# Patient Record
Sex: FEMALE | Race: WHITE | NOT HISPANIC OR LATINO
[De-identification: names, ages, dates, MRNs, and addresses within clinical notes are randomized per-mention and may not be internally consistent; named-entity substitution may affect disease eponyms.]

---

## 2021-05-06 ENCOUNTER — APPOINTMENT (OUTPATIENT)
Age: 26
End: 2021-05-06
Payer: MEDICAID

## 2021-05-06 PROCEDURE — 0002A: CPT

## 2021-06-14 PROBLEM — Z00.00 ENCOUNTER FOR PREVENTIVE HEALTH EXAMINATION: Status: ACTIVE | Noted: 2021-06-14

## 2021-06-24 ENCOUNTER — APPOINTMENT (OUTPATIENT)
Dept: GASTROENTEROLOGY | Facility: CLINIC | Age: 26
End: 2021-06-24

## 2021-12-01 ENCOUNTER — TRANSCRIPTION ENCOUNTER (OUTPATIENT)
Age: 26
End: 2021-12-01

## 2021-12-01 ENCOUNTER — APPOINTMENT (OUTPATIENT)
Dept: ULTRASOUND IMAGING | Facility: CLINIC | Age: 26
End: 2021-12-01
Payer: MEDICAID

## 2021-12-01 ENCOUNTER — RESULT REVIEW (OUTPATIENT)
Age: 26
End: 2021-12-01

## 2021-12-01 ENCOUNTER — OUTPATIENT (OUTPATIENT)
Dept: OUTPATIENT SERVICES | Facility: HOSPITAL | Age: 26
LOS: 1 days | End: 2021-12-01
Payer: MEDICAID

## 2021-12-01 DIAGNOSIS — Z00.00 ENCOUNTER FOR GENERAL ADULT MEDICAL EXAMINATION WITHOUT ABNORMAL FINDINGS: ICD-10-CM

## 2021-12-01 PROCEDURE — 19083 BX BREAST 1ST LESION US IMAG: CPT

## 2021-12-01 PROCEDURE — A4648: CPT

## 2021-12-01 PROCEDURE — 88305 TISSUE EXAM BY PATHOLOGIST: CPT | Mod: 26

## 2021-12-01 PROCEDURE — 19083 BX BREAST 1ST LESION US IMAG: CPT | Mod: RT

## 2021-12-01 PROCEDURE — 88305 TISSUE EXAM BY PATHOLOGIST: CPT

## 2023-05-23 ENCOUNTER — APPOINTMENT (OUTPATIENT)
Dept: ORTHOPEDIC SURGERY | Facility: CLINIC | Age: 28
End: 2023-05-23
Payer: COMMERCIAL

## 2023-05-23 VITALS — WEIGHT: 165 LBS | BODY MASS INDEX: 26.52 KG/M2 | HEIGHT: 66 IN

## 2023-05-23 DIAGNOSIS — Z78.9 OTHER SPECIFIED HEALTH STATUS: ICD-10-CM

## 2023-05-23 PROCEDURE — 99204 OFFICE O/P NEW MOD 45 MIN: CPT

## 2023-05-23 PROCEDURE — 72070 X-RAY EXAM THORAC SPINE 2VWS: CPT

## 2023-05-23 NOTE — ASSESSMENT
[FreeTextEntry1] : 28 years old Femal with axial back pain.  had what sounds like radiculopathy that has improved.\par taking nsaids intermittently\par c/w nsaids\par PT\par FU 6 weeks\par if pain persists MRI T spine

## 2023-05-23 NOTE — HISTORY OF PRESENT ILLNESS
[de-identified] : The patient is a 28 year old female who presents today complaining of T-Spine.\par Date of Injury/Onset: ~ 3 month\par Pain:    At Rest: 0/10  \par With Activity:  4/10  \par Mechanism of injury: pt reports taking a acro yoga class, and was on top of partner and landed on her back. She reports hearing a pop.\par Quality of symptoms: Sharp, pressure around the spine\par Improves with: Ibuprofen\par Worse with: Sleeping, exercises\par Prior treatment/ Imaging: Went to [facility] in [place] on same day ->  XRay - \par Out of work: ;Since:  Currently working\par School/Sport/Position/Occupation: Mental Health Counselor and \par Additional Information: \par \par 5/23/23 27 y/o female here today in the office with midline mid-thoracic back pain for the past 2-1/2 months after sustaining injury while doing acro-yoga. She was in a tuck and directly on her back. She was brought to the hospital immediately at the time of the injury. Xrays were taken and patient was discharged on NSAIDs. Currently she takes Ibuprofen at night when if it wakes her from sleep. The pain radiates lateral, originally it radiated anteriorly around the chest. No c/o numbness or paraesthesias. No bowel or bladder incontinence. No gait disturbance. No rent PT, chiro, acupuncture.  Has tried to return to activity and is back to doing yoga but having pain when trying to lift weights. \par

## 2023-05-23 NOTE — IMAGING
[de-identified] : Lumbar Spine:\par Inspection/Palpation:\par No tenderness to palpation throughout Cervical/thoracic/lumbar spine.\par No bony step offs, No lesions.\par \par Gait:\par Non-antalgic, able to perform bilateral toe and heel rise. Able to perform tandem gait. \par \par Range of Motion:\par Lumbar Spine: Flexion to 90 degrees, Extension to 30 degrees, rotation 30 degrees bilaterally, lateral flexion to 30 degrees bilaterally. No pain with extension\par \par Neurologic:\par Bilateral Lower Extremities 5/5 Iliopsoas/Quadriceps/Hamstrings/ Tibialis Anterior/ Gastrocnemius. Extensor Hallucis Longus/ Flexor Hallucis Longus \par \par Sensation intact to light touch L2-S1\par \par Patellar/ Achilles reflex within normal limits.\par \par Negative straight leg raise\par \par No pain with IR/ER/Flexion of Hips bilaterally \par \par x-ray ap/lateral thoracic spine shows no fractures.  No malalignment.

## 2023-05-23 NOTE — IMAGING
[de-identified] : Lumbar Spine:\par Inspection/Palpation:\par No tenderness to palpation throughout Cervical/thoracic/lumbar spine.\par No bony step offs, No lesions.\par \par Gait:\par Non-antalgic, able to perform bilateral toe and heel rise. Able to perform tandem gait. \par \par Range of Motion:\par Lumbar Spine: Flexion to 90 degrees, Extension to 30 degrees, rotation 30 degrees bilaterally, lateral flexion to 30 degrees bilaterally. No pain with extension\par \par Neurologic:\par Bilateral Lower Extremities 5/5 Iliopsoas/Quadriceps/Hamstrings/ Tibialis Anterior/ Gastrocnemius. Extensor Hallucis Longus/ Flexor Hallucis Longus \par \par Sensation intact to light touch L2-S1\par \par Patellar/ Achilles reflex within normal limits.\par \par Negative straight leg raise\par \par No pain with IR/ER/Flexion of Hips bilaterally \par \par x-ray ap/lateral thoracic spine shows no fractures.  No malalignment.

## 2023-05-23 NOTE — HISTORY OF PRESENT ILLNESS
[de-identified] : The patient is a 28 year old female who presents today complaining of T-Spine.\par Date of Injury/Onset: ~ 3 month\par Pain:    At Rest: 0/10  \par With Activity:  4/10  \par Mechanism of injury: pt reports taking a acro yoga class, and was on top of partner and landed on her back. She reports hearing a pop.\par Quality of symptoms: Sharp, pressure around the spine\par Improves with: Ibuprofen\par Worse with: Sleeping, exercises\par Prior treatment/ Imaging: Went to [facility] in [place] on same day ->  XRay - \par Out of work: ;Since:  Currently working\par School/Sport/Position/Occupation: Mental Health Counselor and \par Additional Information: \par \par 5/23/23 27 y/o female here today in the office with midline mid-thoracic back pain for the past 2-1/2 months after sustaining injury while doing acro-yoga. She was in a tuck and directly on her back. She was brought to the hospital immediately at the time of the injury. Xrays were taken and patient was discharged on NSAIDs. Currently she takes Ibuprofen at night when if it wakes her from sleep. The pain radiates lateral, originally it radiated anteriorly around the chest. No c/o numbness or paraesthesias. No bowel or bladder incontinence. No gait disturbance. No rent PT, chiro, acupuncture.  Has tried to return to activity and is back to doing yoga but having pain when trying to lift weights. \par

## 2023-07-05 ENCOUNTER — APPOINTMENT (OUTPATIENT)
Dept: ORTHOPEDIC SURGERY | Facility: CLINIC | Age: 28
End: 2023-07-05
Payer: COMMERCIAL

## 2023-07-05 VITALS — HEIGHT: 66 IN | WEIGHT: 165 LBS | BODY MASS INDEX: 26.52 KG/M2

## 2023-07-05 PROCEDURE — 99213 OFFICE O/P EST LOW 20 MIN: CPT

## 2023-07-05 NOTE — ASSESSMENT
[FreeTextEntry1] : 28 years old Femal with axial back pain.  had what sounds like radiculopathy that has improved.  Axial back pain persisting despite PT and NSAIDS over last 6 Weeks. \par MRI T spine rule out HNP\par FU After MRI

## 2023-07-05 NOTE — IMAGING
[de-identified] : Lumbar Spine:\par Inspection/Palpation:\par No tenderness to palpation throughout Cervical/thoracic/lumbar spine.\par No bony step offs, No lesions.\par \par Gait:\par Non-antalgic, able to perform bilateral toe and heel rise. Able to perform tandem gait. \par \par Range of Motion:\par Lumbar Spine: Flexion to 90 degrees, Extension to 30 degrees, rotation 30 degrees bilaterally, lateral flexion to 30 degrees bilaterally. No pain with extension\par \par Neurologic:\par Bilateral Lower Extremities 5/5 Iliopsoas/Quadriceps/Hamstrings/ Tibialis Anterior/ Gastrocnemius. Extensor Hallucis Longus/ Flexor Hallucis Longus \par \par Sensation intact to light touch L2-S1\par \par Patellar/ Achilles reflex within normal limits.\par \par Negative straight leg raise\par \par No pain with IR/ER/Flexion of Hips bilaterally \par \par x-ray ap/lateral thoracic spine shows no fractures.  No malalignment.

## 2023-07-05 NOTE — HISTORY OF PRESENT ILLNESS
[Mid-back] : mid-back [0] : 0 [Sharp] : sharp [2] : 2 [de-identified] : The patient is a 28 year old female who presents today complaining of T-Spine.\par Date of Injury/Onset: ~ 3 month\par Pain:    At Rest: 0/10  \par With Activity:  4/10  \par Mechanism of injury: pt reports taking a acro yoga class, and was on top of partner and landed on her back. She reports hearing a pop.\par Quality of symptoms: Sharp, pressure around the spine\par Improves with: Ibuprofen\par Worse with: Sleeping, exercises\par Prior treatment/ Imaging: Went to [facility] in [place] on same day ->  XRay - \par Out of work: ;Since:  Currently working\par School/Sport/Position/Occupation: Mental Health Counselor and \par Additional Information: \par \par 5/23/23 29 y/o female here today in the office with midline mid-thoracic back pain for the past 2-1/2 months after sustaining injury while doing acro-yoga. She was in a tuck and directly on her back. She was brought to the hospital immediately at the time of the injury. Xrays were taken and patient was discharged on NSAIDs. Currently she takes Ibuprofen at night when if it wakes her from sleep. The pain radiates lateral, originally it radiated anteriorly around the chest. No c/o numbness or paraesthesias. No bowel or bladder incontinence. No gait disturbance. No rent PT, chiro, acupuncture.  Has tried to return to activity and is back to doing yoga but having pain when trying to lift weights. \par \par 7/5/23: continuing NSAIDs, completed PT.  Pain still in interscapular region at times.  NO neck pain. [] : no

## 2023-07-11 ENCOUNTER — RESULT REVIEW (OUTPATIENT)
Age: 28
End: 2023-07-11

## 2023-07-21 ENCOUNTER — APPOINTMENT (OUTPATIENT)
Dept: ORTHOPEDIC SURGERY | Facility: CLINIC | Age: 28
End: 2023-07-21
Payer: COMMERCIAL

## 2023-07-21 VITALS — HEIGHT: 66 IN | WEIGHT: 165 LBS | BODY MASS INDEX: 26.52 KG/M2

## 2023-07-21 DIAGNOSIS — M54.6 PAIN IN THORACIC SPINE: ICD-10-CM

## 2023-07-21 PROCEDURE — 99213 OFFICE O/P EST LOW 20 MIN: CPT

## 2023-07-21 NOTE — IMAGING
[de-identified] : Lumbar Spine:\par Inspection/Palpation:\par No tenderness to palpation throughout Cervical/thoracic/lumbar spine.\par No bony step offs, No lesions.\par \par Gait:\par Non-antalgic, able to perform bilateral toe and heel rise. Able to perform tandem gait. \par \par Range of Motion:\par Lumbar Spine: Flexion to 90 degrees, Extension to 30 degrees, rotation 30 degrees bilaterally, lateral flexion to 30 degrees bilaterally. No pain with extension\par \par Neurologic:\par Bilateral Lower Extremities 5/5 Iliopsoas/Quadriceps/Hamstrings/ Tibialis Anterior/ Gastrocnemius. Extensor Hallucis Longus/ Flexor Hallucis Longus \par \par Sensation intact to light touch L2-S1\par \par Patellar/ Achilles reflex within normal limits.\par \par Negative straight leg raise\par \par No pain with IR/ER/Flexion of Hips bilaterally \par \par x-ray ap/lateral thoracic spine shows no fractures.  No malalignment.  \par \par MRI Thoracic spine reviewed and interpreted independently.  Agree with report as follows. \par Healed T2 and T5 VCF

## 2023-07-21 NOTE — ASSESSMENT
[FreeTextEntry1] : 28 years old Female with axial back pain.  had what sounds like radiculopathy that has improved.  Axial back pain persisting despite PT and NSAIDS over last 6 Weeks. \par T spien showed old VCF\par FU PRN

## 2023-07-21 NOTE — HISTORY OF PRESENT ILLNESS
[Mid-back] : mid-back [2] : 2 [0] : 0 [Sharp] : sharp [de-identified] : The patient is a 28 year old female who presents today complaining of T-Spine.\par Date of Injury/Onset: ~ 3 month\par Pain:    At Rest: 0/10  \par With Activity:  4/10  \par Mechanism of injury: pt reports taking a acro yoga class, and was on top of partner and landed on her back. She reports hearing a pop.\par Quality of symptoms: Sharp, pressure around the spine\par Improves with: Ibuprofen\par Worse with: Sleeping, exercises\par Prior treatment/ Imaging: Went to [facility] in [place] on same day ->  XRay - \par Out of work: ;Since:  Currently working\par School/Sport/Position/Occupation: Mental Health Counselor and \par Additional Information: \par \par 5/23/23 27 y/o female here today in the office with midline mid-thoracic back pain for the past 2-1/2 months after sustaining injury while doing acro-yoga. She was in a tuck and directly on her back. She was brought to the hospital immediately at the time of the injury. Xrays were taken and patient was discharged on NSAIDs. Currently she takes Ibuprofen at night when if it wakes her from sleep. The pain radiates lateral, originally it radiated anteriorly around the chest. No c/o numbness or paraesthesias. No bowel or bladder incontinence. No gait disturbance. No rent PT, chiro, acupuncture.  Has tried to return to activity and is back to doing yoga but having pain when trying to lift weights. \par \par 7/5/23: continuing NSAIDs, completed PT.  Pain still in interscapular region at times.  NO neck pain. [] : no
